# Patient Record
Sex: MALE | Race: WHITE | NOT HISPANIC OR LATINO | Employment: FULL TIME | URBAN - NONMETROPOLITAN AREA
[De-identification: names, ages, dates, MRNs, and addresses within clinical notes are randomized per-mention and may not be internally consistent; named-entity substitution may affect disease eponyms.]

---

## 2023-09-12 ENCOUNTER — OFFICE VISIT (OUTPATIENT)
Dept: PRIMARY CARE | Facility: CLINIC | Age: 43
End: 2023-09-12
Payer: COMMERCIAL

## 2023-09-12 VITALS
HEART RATE: 84 BPM | DIASTOLIC BLOOD PRESSURE: 74 MMHG | OXYGEN SATURATION: 98 % | BODY MASS INDEX: 38.22 KG/M2 | WEIGHT: 288.4 LBS | SYSTOLIC BLOOD PRESSURE: 130 MMHG | TEMPERATURE: 96.8 F | HEIGHT: 73 IN

## 2023-09-12 DIAGNOSIS — J06.9 UPPER RESPIRATORY TRACT INFECTION, UNSPECIFIED TYPE: ICD-10-CM

## 2023-09-12 DIAGNOSIS — H65.01 NON-RECURRENT ACUTE SEROUS OTITIS MEDIA OF RIGHT EAR: Primary | ICD-10-CM

## 2023-09-12 PROBLEM — I10 HTN (HYPERTENSION): Status: ACTIVE | Noted: 2023-09-12

## 2023-09-12 PROBLEM — R05.9 COUGH: Status: ACTIVE | Noted: 2023-09-12

## 2023-09-12 PROBLEM — R93.89 ABNORMAL CHEST XRAY: Status: ACTIVE | Noted: 2023-09-12

## 2023-09-12 PROBLEM — E66.9 OBESITY (BMI 30-39.9): Status: ACTIVE | Noted: 2023-09-12

## 2023-09-12 PROBLEM — L08.9 SKIN INFECTION: Status: ACTIVE | Noted: 2023-09-12

## 2023-09-12 PROBLEM — J45.909 ASTHMA (HHS-HCC): Status: ACTIVE | Noted: 2023-09-12

## 2023-09-12 PROBLEM — J32.0 CHRONIC MAXILLARY SINUSITIS: Status: ACTIVE | Noted: 2023-09-12

## 2023-09-12 PROBLEM — K21.9 GERD (GASTROESOPHAGEAL REFLUX DISEASE): Status: ACTIVE | Noted: 2023-09-12

## 2023-09-12 PROBLEM — R06.09 DYSPNEA ON EXERTION: Status: ACTIVE | Noted: 2023-09-12

## 2023-09-12 PROBLEM — R50.9 FEVER: Status: ACTIVE | Noted: 2023-09-12

## 2023-09-12 PROBLEM — M10.9 GOUT: Status: ACTIVE | Noted: 2023-09-12

## 2023-09-12 PROBLEM — J30.2 SEASONAL ALLERGIES: Status: ACTIVE | Noted: 2023-09-12

## 2023-09-12 PROCEDURE — 3078F DIAST BP <80 MM HG: CPT | Performed by: NURSE PRACTITIONER

## 2023-09-12 PROCEDURE — 99213 OFFICE O/P EST LOW 20 MIN: CPT | Performed by: NURSE PRACTITIONER

## 2023-09-12 PROCEDURE — 3075F SYST BP GE 130 - 139MM HG: CPT | Performed by: NURSE PRACTITIONER

## 2023-09-12 PROCEDURE — 87635 SARS-COV-2 COVID-19 AMP PRB: CPT

## 2023-09-12 RX ORDER — AMOXICILLIN 500 MG/1
1000 CAPSULE ORAL EVERY 12 HOURS SCHEDULED
Qty: 28 CAPSULE | Refills: 0 | Status: SHIPPED | OUTPATIENT
Start: 2023-09-12 | End: 2023-09-19

## 2023-09-12 RX ORDER — FLUTICASONE PROPIONATE 50 MCG
SPRAY, SUSPENSION (ML) NASAL
COMMUNITY

## 2023-09-12 RX ORDER — LEVOCETIRIZINE DIHYDROCHLORIDE 5 MG/1
TABLET, FILM COATED ORAL EVERY EVENING
COMMUNITY

## 2023-09-12 RX ORDER — ALBUTEROL SULFATE 90 UG/1
AEROSOL, METERED RESPIRATORY (INHALATION)
COMMUNITY
Start: 2021-09-23 | End: 2023-10-03 | Stop reason: SDUPTHER

## 2023-09-12 RX ORDER — VALSARTAN AND HYDROCHLOROTHIAZIDE 160; 25 MG/1; MG/1
TABLET ORAL
COMMUNITY
End: 2024-04-16 | Stop reason: SDUPTHER

## 2023-09-12 ASSESSMENT — ENCOUNTER SYMPTOMS
MYALGIAS: 0
DIAPHORESIS: 1
DIZZINESS: 0
VOMITING: 0
FEVER: 0
DIARRHEA: 0
COUGH: 1
NAUSEA: 0
SHORTNESS OF BREATH: 0
HEADACHES: 1
LIGHT-HEADEDNESS: 0
ARTHRALGIAS: 0
CHILLS: 1
CHEST TIGHTNESS: 0

## 2023-09-12 NOTE — LETTER
September 12, 2023     Patient: Connor Thornton   YOB: 1980   Date of Visit: 9/12/2023       To Whom It May Concern:    Connor Thornton was seen in my clinic on 9/12/2023 at 9:00 am. Please excuse Connor for his absence from work on 09/11/2023 through 09/13/2023 09 due to illness.   May return to work on 09/14/2023 without restriction     If you have any questions or concerns, please don't hesitate to call.         Sincerely,         Erik Bush, APRN-CNP        CC: No Recipients

## 2023-09-12 NOTE — PROGRESS NOTES
"Subjective   Patient ID: Connor Thornton is a 43 y.o. male who presents for Cough (Started sunday) and Sore Throat.    Sore Throat  Patient complains of sore throat. Associated symptoms include chills, dry cough, nasal blockage, post nasal drip, sinus and nasal congestion, and sore throat. Onset of symptoms was Sunday afternoon,  ago, and have been gradually worsening since that time. He is drinking plenty of fluids. He has had recent close exposure to someone with proven streptococcal pharyngitis.   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  Cough productive at times, thinks it may be sinus  Is using Mucinex DM.    Had COVID twice  Has not tested for COVID  Vaccine status: none          Review of Systems   Constitutional:  Positive for chills and diaphoresis. Negative for fever.   HENT:  Positive for congestion.    Respiratory:  Positive for cough. Negative for chest tightness and shortness of breath.    Cardiovascular:  Negative for chest pain.   Gastrointestinal:  Negative for diarrhea, nausea and vomiting.   Musculoskeletal:  Negative for arthralgias and myalgias.   Neurological:  Positive for headaches. Negative for dizziness and light-headedness.       Objective   /74 (Patient Position: Sitting)   Pulse 84   Temp 36 °C (96.8 °F)   Ht 1.854 m (6' 1\")   Wt 131 kg (288 lb 6.4 oz)   SpO2 98%   BMI 38.05 kg/m²     Physical Exam  Vitals reviewed.   Constitutional:       Appearance: Normal appearance. He is obese.   HENT:      Right Ear: Hearing normal. Drainage and swelling present.      Left Ear: Hearing normal.      Ears:      Comments: Bilateral external canal erythema     Nose:      Right Sinus: Frontal sinus tenderness present.      Left Sinus: No frontal sinus tenderness.      Mouth/Throat:      Lips: Pink.      Pharynx: Pharyngeal swelling and posterior oropharyngeal erythema present. No oropharyngeal exudate. "      Tonsils: No tonsillar exudate.   Cardiovascular:      Rate and Rhythm: Normal rate and regular rhythm.   Pulmonary:      Effort: Pulmonary effort is normal.      Breath sounds: Normal breath sounds.   Skin:     General: Skin is warm and dry.   Neurological:      Mental Status: He is alert.         Assessment/Plan   Diagnoses and all orders for this visit:  Non-recurrent acute serous otitis media of right ear  -     amoxicillin (Amoxil) 500 mg capsule; Take 2 capsules (1,000 mg) by mouth every 12 hours for 7 days.  Upper respiratory tract infection, unspecified type  -     amoxicillin (Amoxil) 500 mg capsule; Take 2 capsules (1,000 mg) by mouth every 12 hours for 7 days.  -     Sars-CoV-2 PCR, Symptomatic; Future  Instructed to isolate at home, until COVID test result available.  May take OTC Mucinex DM for cough/congestion

## 2023-09-13 ENCOUNTER — TELEPHONE (OUTPATIENT)
Dept: PRIMARY CARE | Facility: CLINIC | Age: 43
End: 2023-09-13
Payer: COMMERCIAL

## 2023-09-13 LAB — SARS-COV-2 RESULT: NOT DETECTED

## 2023-09-22 ENCOUNTER — OFFICE VISIT (OUTPATIENT)
Dept: PRIMARY CARE | Facility: CLINIC | Age: 43
End: 2023-09-22
Payer: COMMERCIAL

## 2023-09-22 VITALS
DIASTOLIC BLOOD PRESSURE: 82 MMHG | OXYGEN SATURATION: 99 % | WEIGHT: 305.8 LBS | BODY MASS INDEX: 40.35 KG/M2 | SYSTOLIC BLOOD PRESSURE: 120 MMHG | HEART RATE: 63 BPM

## 2023-09-22 DIAGNOSIS — H66.90 ACUTE OTITIS MEDIA, UNSPECIFIED OTITIS MEDIA TYPE: Primary | ICD-10-CM

## 2023-09-22 PROCEDURE — 3074F SYST BP LT 130 MM HG: CPT | Performed by: STUDENT IN AN ORGANIZED HEALTH CARE EDUCATION/TRAINING PROGRAM

## 2023-09-22 PROCEDURE — 1036F TOBACCO NON-USER: CPT | Performed by: STUDENT IN AN ORGANIZED HEALTH CARE EDUCATION/TRAINING PROGRAM

## 2023-09-22 PROCEDURE — 99213 OFFICE O/P EST LOW 20 MIN: CPT | Performed by: STUDENT IN AN ORGANIZED HEALTH CARE EDUCATION/TRAINING PROGRAM

## 2023-09-22 PROCEDURE — 3079F DIAST BP 80-89 MM HG: CPT | Performed by: STUDENT IN AN ORGANIZED HEALTH CARE EDUCATION/TRAINING PROGRAM

## 2023-09-22 RX ORDER — CEFDINIR 300 MG/1
300 CAPSULE ORAL 2 TIMES DAILY
Qty: 14 CAPSULE | Refills: 0 | Status: SHIPPED | OUTPATIENT
Start: 2023-09-22 | End: 2023-10-03 | Stop reason: ALTCHOICE

## 2023-09-22 NOTE — PROGRESS NOTES
Subjective   Patient ID: Connor Thornton is a 43 y.o. male who presents for Earache (Right ear feels like it's going to left ear).    HPI    Initially had left ear infection which was treated about a week ago. Now symptoms in the right ear. Worsening. Mild pain.     Review of Systems  ROS negative except discussed above in HPI.    Vitals:    09/22/23 1601   BP: 120/82   Pulse: 63   SpO2: 99%     Objective   Physical Exam  HENT:      Ears:      Comments: Inflammation of the right TM. Normal left TM.      Nose:      Comments: Mild turbinate inflammation.       Assessment/Plan   Connor was seen today for earache.  Diagnoses and all orders for this visit:  Acute otitis media, unspecified otitis media type (Primary)  -     cefdinir (Omnicef) 300 mg capsule; Take 1 capsule (300 mg) by mouth 2 times a day for 7 days.      Follow up as needed         Gerardo Ordonez MD MPH

## 2023-10-03 ENCOUNTER — OFFICE VISIT (OUTPATIENT)
Dept: PRIMARY CARE | Facility: CLINIC | Age: 43
End: 2023-10-03
Payer: COMMERCIAL

## 2023-10-03 ENCOUNTER — LAB (OUTPATIENT)
Dept: LAB | Facility: LAB | Age: 43
End: 2023-10-03
Payer: COMMERCIAL

## 2023-10-03 VITALS
WEIGHT: 303.3 LBS | BODY MASS INDEX: 40.2 KG/M2 | DIASTOLIC BLOOD PRESSURE: 92 MMHG | HEIGHT: 73 IN | OXYGEN SATURATION: 96 % | HEART RATE: 80 BPM | SYSTOLIC BLOOD PRESSURE: 120 MMHG

## 2023-10-03 DIAGNOSIS — M10.071 ACUTE IDIOPATHIC GOUT INVOLVING TOE OF RIGHT FOOT: Primary | ICD-10-CM

## 2023-10-03 DIAGNOSIS — J45.909 ASTHMA, UNSPECIFIED ASTHMA SEVERITY, UNSPECIFIED WHETHER COMPLICATED, UNSPECIFIED WHETHER PERSISTENT (HHS-HCC): ICD-10-CM

## 2023-10-03 DIAGNOSIS — M10.071 ACUTE IDIOPATHIC GOUT INVOLVING TOE OF RIGHT FOOT: ICD-10-CM

## 2023-10-03 LAB — URATE SERPL-MCNC: 8.5 MG/DL (ref 4–7.5)

## 2023-10-03 PROCEDURE — 3074F SYST BP LT 130 MM HG: CPT | Performed by: FAMILY MEDICINE

## 2023-10-03 PROCEDURE — 1036F TOBACCO NON-USER: CPT | Performed by: FAMILY MEDICINE

## 2023-10-03 PROCEDURE — 36415 COLL VENOUS BLD VENIPUNCTURE: CPT

## 2023-10-03 PROCEDURE — 3080F DIAST BP >= 90 MM HG: CPT | Performed by: FAMILY MEDICINE

## 2023-10-03 PROCEDURE — 99214 OFFICE O/P EST MOD 30 MIN: CPT | Performed by: FAMILY MEDICINE

## 2023-10-03 RX ORDER — ALBUTEROL SULFATE 90 UG/1
2 AEROSOL, METERED RESPIRATORY (INHALATION) EVERY 6 HOURS PRN
Qty: 18 G | Refills: 11 | Status: SHIPPED | OUTPATIENT
Start: 2023-10-03 | End: 2023-10-17 | Stop reason: SDUPTHER

## 2023-10-03 RX ORDER — PREDNISONE 20 MG/1
TABLET ORAL
Qty: 21 TABLET | Refills: 0 | Status: SHIPPED | OUTPATIENT
Start: 2023-10-03 | End: 2023-10-17 | Stop reason: ALTCHOICE

## 2023-10-03 ASSESSMENT — ENCOUNTER SYMPTOMS
FEVER: 0
CHILLS: 1

## 2023-10-03 NOTE — PROGRESS NOTES
"Subjective   Patient ID: Connor Thornton is a 43 y.o. male who presents for ST,ear pain, and gout (Pt.c/o ST, left ear pain, and gout flare up R big toe.).    HPI   3 weeks ago left ear and st woke up and could not swallow with gagging and dry heaving   Tx amoxil   Dx with infection   Felt better   After 4 days felt bad again   Then ear pain form right to left with sT   Rx cefdinir  which really did not help   Allergies on nasal spray and xyzal keep in control  Yestereday huert to swallow   Coughing last night by dry and taking mucine x dm     Gout 15 years ago rarely has flare up   Today yesterday gets 2 per year       Review of Systems   Constitutional:  Positive for chills. Negative for fever.       Objective   BP (!) 120/92   Pulse 80   Ht 1.854 m (6' 1\")   Wt 138 kg (303 lb 4.8 oz)   SpO2 96%   BMI 40.02 kg/m²     Physical Exam  Constitutional:       Appearance: Normal appearance.   HENT:      Head: Normocephalic and atraumatic.      Right Ear: Tympanic membrane, ear canal and external ear normal. There is no impacted cerumen.      Left Ear: Tympanic membrane, ear canal and external ear normal. There is no impacted cerumen.      Nose: Nose normal.      Mouth/Throat:      Mouth: Mucous membranes are moist.      Pharynx: Posterior oropharyngeal erythema present. No oropharyngeal exudate.   Eyes:      Conjunctiva/sclera: Conjunctivae normal.      Pupils: Pupils are equal, round, and reactive to light.   Cardiovascular:      Heart sounds: Normal heart sounds.   Pulmonary:      Effort: Pulmonary effort is normal.      Breath sounds: Normal breath sounds.   Musculoskeletal:      Comments: Right great toe dip joint red and swollen   Lymphadenopathy:      Cervical: No cervical adenopathy.   Skin:     Coloration: Skin is not jaundiced.   Neurological:      General: No focal deficit present.      Mental Status: He is alert and oriented to person, place, and time.   Psychiatric:         Mood and Affect: Mood normal.   "       Behavior: Behavior normal.         Thought Content: Thought content normal.         Judgment: Judgment normal.         Assessment/Plan   Diagnoses and all orders for this visit:  Acute idiopathic gout involving toe of right foot  -     Uric acid; Future  -     predniSONE (Deltasone) 20 mg tablet; Take 3 tabs (60mg) daily for 5 days, then take 2 tabs (40mg) daily for 2 days, then take 1 tab (20mg) daily for 2 days.  Asthma, unspecified asthma severity, unspecified whether complicated, unspecified whether persistent  -     albuterol 90 mcg/actuation inhaler; Inhale 2 puffs every 6 hours if needed for wheezing.

## 2023-10-04 DIAGNOSIS — M10.071 ACUTE IDIOPATHIC GOUT INVOLVING TOE OF RIGHT FOOT: Primary | ICD-10-CM

## 2023-10-04 RX ORDER — ALLOPURINOL 300 MG/1
300 TABLET ORAL DAILY
Qty: 30 TABLET | Refills: 11 | Status: SHIPPED | OUTPATIENT
Start: 2023-10-04 | End: 2023-10-17 | Stop reason: SDUPTHER

## 2023-10-11 ENCOUNTER — APPOINTMENT (OUTPATIENT)
Dept: PRIMARY CARE | Facility: CLINIC | Age: 43
End: 2023-10-11
Payer: COMMERCIAL

## 2023-10-17 ENCOUNTER — OFFICE VISIT (OUTPATIENT)
Dept: PRIMARY CARE | Facility: CLINIC | Age: 43
End: 2023-10-17
Payer: COMMERCIAL

## 2023-10-17 VITALS
DIASTOLIC BLOOD PRESSURE: 88 MMHG | HEART RATE: 92 BPM | HEIGHT: 73 IN | WEIGHT: 301.2 LBS | OXYGEN SATURATION: 96 % | SYSTOLIC BLOOD PRESSURE: 122 MMHG | BODY MASS INDEX: 39.92 KG/M2

## 2023-10-17 DIAGNOSIS — J45.909 ASTHMA, UNSPECIFIED ASTHMA SEVERITY, UNSPECIFIED WHETHER COMPLICATED, UNSPECIFIED WHETHER PERSISTENT (HHS-HCC): ICD-10-CM

## 2023-10-17 DIAGNOSIS — Q25.49: Primary | ICD-10-CM

## 2023-10-17 DIAGNOSIS — R73.03 PREDIABETES: ICD-10-CM

## 2023-10-17 DIAGNOSIS — M10.071 ACUTE IDIOPATHIC GOUT INVOLVING TOE OF RIGHT FOOT: ICD-10-CM

## 2023-10-17 PROCEDURE — 3079F DIAST BP 80-89 MM HG: CPT | Performed by: FAMILY MEDICINE

## 2023-10-17 PROCEDURE — 3074F SYST BP LT 130 MM HG: CPT | Performed by: FAMILY MEDICINE

## 2023-10-17 PROCEDURE — 1036F TOBACCO NON-USER: CPT | Performed by: FAMILY MEDICINE

## 2023-10-17 PROCEDURE — 99214 OFFICE O/P EST MOD 30 MIN: CPT | Performed by: FAMILY MEDICINE

## 2023-10-17 RX ORDER — BENZONATATE 200 MG/1
200 CAPSULE ORAL 3 TIMES DAILY PRN
Qty: 42 CAPSULE | Refills: 0 | Status: SHIPPED | OUTPATIENT
Start: 2023-10-17 | End: 2023-11-16

## 2023-10-17 RX ORDER — ESOMEPRAZOLE MAGNESIUM 40 MG/1
40 CAPSULE, DELAYED RELEASE ORAL DAILY PRN
COMMUNITY

## 2023-10-17 RX ORDER — ALLOPURINOL 300 MG/1
300 TABLET ORAL DAILY
Qty: 90 TABLET | Refills: 3 | Status: SHIPPED | OUTPATIENT
Start: 2023-10-17 | End: 2024-10-16

## 2023-10-17 RX ORDER — MONTELUKAST SODIUM 10 MG/1
10 TABLET ORAL NIGHTLY
Qty: 30 TABLET | Refills: 11 | Status: SHIPPED | OUTPATIENT
Start: 2023-10-17 | End: 2024-10-16

## 2023-10-17 RX ORDER — ALBUTEROL SULFATE 90 UG/1
2 AEROSOL, METERED RESPIRATORY (INHALATION) EVERY 6 HOURS PRN
Qty: 18 G | Refills: 11 | Status: SHIPPED | OUTPATIENT
Start: 2023-10-17 | End: 2024-10-16

## 2023-10-17 NOTE — PROGRESS NOTES
"Subjective   Patient ID: Connor Thornton is a 43 y.o. male who presents for 1 year.    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   Was rescanned in [place] 2022 and rescan every 5 years     Gout with high uric acid level      Started allopurinol     Asthma      Cough has been going on since on prednisone      Dry but occ some productive and thin clear       Sleep with fan on       Early am had some green tinge last week  but now clearerl           Last used albuterol yesterday and can breath better but the cough           Cough was worse at work     Allergy symptoms      In the fall             Review of Systems    Objective   /88   Pulse 92   Ht 1.854 m (6' 1\")   Wt 137 kg (301 lb 3.2 oz)   SpO2 96%   BMI 39.74 kg/m²     Physical Exam  Vitals reviewed.   Constitutional:       Appearance: Normal appearance.   HENT:      Head: Normocephalic and atraumatic.   Eyes:      Conjunctiva/sclera: Conjunctivae normal.   Cardiovascular:      Rate and Rhythm: Normal rate and regular rhythm.   Pulmonary:      Effort: Pulmonary effort is normal.      Breath sounds: Normal breath sounds.   Musculoskeletal:      Cervical back: Neck supple.   Skin:     General: Skin is warm and dry.   Neurological:      General: No focal deficit present.      Mental Status: He is alert and oriented to person, place, and time.   Psychiatric:         Mood and Affect: Mood normal.         Behavior: Behavior normal.         Thought Content: Thought content normal.         Judgment: Judgment normal.         Assessment/Plan   Diagnoses and all orders for this visit:  Kommerell's diverticulum  Asthma, unspecified asthma severity, unspecified whether complicated, unspecified whether persistent  -     albuterol 90 mcg/actuation inhaler; Inhale 2 puffs every 6 hours if needed for wheezing.  -     montelukast (Singulair) 10 mg tablet; Take 1 tablet (10 mg) by mouth once " daily at bedtime.  -     benzonatate (Tessalon) 200 mg capsule; Take 1 capsule (200 mg) by mouth 3 times a day as needed for cough. Do not crush or chew.  Acute idiopathic gout involving toe of right foot  -     allopurinol (Zyloprim) 300 mg tablet; Take 1 tablet (300 mg) by mouth once daily.  Prediabetes  -     Hemoglobin A1C; Future

## 2024-04-16 DIAGNOSIS — I10 HYPERTENSION, UNSPECIFIED TYPE: ICD-10-CM

## 2024-04-16 RX ORDER — VALSARTAN AND HYDROCHLOROTHIAZIDE 160; 25 MG/1; MG/1
1 TABLET ORAL DAILY
Qty: 90 TABLET | Refills: 1 | Status: SHIPPED | OUTPATIENT
Start: 2024-04-16

## 2024-04-16 NOTE — TELEPHONE ENCOUNTER
Medication Refill Request    Medication:  valsartan    Pharmacy:  [facility]    Last OV:  10/17/23  Upcoming appointment:  10/22/24    ORDER PENDED

## 2024-04-19 ENCOUNTER — OFFICE VISIT (OUTPATIENT)
Dept: PRIMARY CARE | Facility: CLINIC | Age: 44
End: 2024-04-19
Payer: COMMERCIAL

## 2024-04-19 VITALS
SYSTOLIC BLOOD PRESSURE: 140 MMHG | BODY MASS INDEX: 40.98 KG/M2 | TEMPERATURE: 97.3 F | WEIGHT: 310.6 LBS | HEART RATE: 72 BPM | OXYGEN SATURATION: 96 % | DIASTOLIC BLOOD PRESSURE: 90 MMHG

## 2024-04-19 DIAGNOSIS — S83.411A SPRAIN OF MEDIAL COLLATERAL LIGAMENT OF RIGHT KNEE, INITIAL ENCOUNTER: ICD-10-CM

## 2024-04-19 DIAGNOSIS — R10.31 RIGHT INGUINAL PAIN: ICD-10-CM

## 2024-04-19 DIAGNOSIS — J40 BRONCHITIS: Primary | ICD-10-CM

## 2024-04-19 PROCEDURE — 1036F TOBACCO NON-USER: CPT | Performed by: FAMILY MEDICINE

## 2024-04-19 PROCEDURE — 3080F DIAST BP >= 90 MM HG: CPT | Performed by: FAMILY MEDICINE

## 2024-04-19 PROCEDURE — 3077F SYST BP >= 140 MM HG: CPT | Performed by: FAMILY MEDICINE

## 2024-04-19 PROCEDURE — 99214 OFFICE O/P EST MOD 30 MIN: CPT | Performed by: FAMILY MEDICINE

## 2024-04-19 RX ORDER — DOXYCYCLINE 100 MG/1
100 CAPSULE ORAL 2 TIMES DAILY
Qty: 20 CAPSULE | Refills: 0 | Status: SHIPPED | OUTPATIENT
Start: 2024-04-19 | End: 2024-04-29

## 2024-04-19 RX ORDER — BENZONATATE 200 MG/1
200 CAPSULE ORAL 3 TIMES DAILY PRN
Qty: 42 CAPSULE | Refills: 0 | Status: SHIPPED | OUTPATIENT
Start: 2024-04-19 | End: 2024-05-19

## 2024-04-19 NOTE — PROGRESS NOTES
Subjective   Patient ID: Connor Thornton is a 43 y.o. male who presents for URI (Symptoms started on Tuesday; dry cough, sinus congestion, sinus drainage/When coughing has some discomfort RLQ/groin) and Knee Pain (Right; swells at night, achy pain/Strained knee while bowling).    URI     Knee Pain        Knee no injury bowled Saturday and had some toe pain 3 weeks ago   Next day knee hurt now comes and goes   Knee sleeve hurts  Behnid knee and  medial aching pain   No clicking no new catching states has had some grinding   Was swollen then next week   Otc aleve     Right groin with cough felt popping  No test pain   X 6 months   Daily    If gets then will call ing us     Cough 3 days mucus white clear   + sinus HA  right frontal parietal     No fever    No body aches    Review of Systems    Objective   /90 (BP Location: Left arm, Patient Position: Sitting)   Pulse 72   Temp 36.3 °C (97.3 °F)   Wt 141 kg (310 lb 9.6 oz)   SpO2 96%   BMI 40.98 kg/m²     Physical Exam  Constitutional:       Appearance: Normal appearance.   HENT:      Head: Normocephalic and atraumatic.      Right Ear: Tympanic membrane, ear canal and external ear normal.      Left Ear: Ear canal and external ear normal.      Nose: Nose normal.      Mouth/Throat:      Mouth: Mucous membranes are moist.      Pharynx: Oropharynx is clear.   Eyes:      Conjunctiva/sclera: Conjunctivae normal.      Pupils: Pupils are equal, round, and reactive to light.   Cardiovascular:      Rate and Rhythm: Normal rate and regular rhythm.      Heart sounds: Normal heart sounds.   Pulmonary:      Effort: Pulmonary effort is normal.      Breath sounds: Normal breath sounds.   Abdominal:      General: There is no distension.      Palpations: There is no mass.      Tenderness: There is no abdominal tenderness. There is no guarding or rebound.      Hernia: No hernia is present.   Musculoskeletal:      Comments: Right knee full range of motion no patellar apprehension  no joint effusion is tender with valgus stressing of the distal medial collateral on the right.  Normal DTRs no ligament stability.   Lymphadenopathy:      Cervical: No cervical adenopathy.   Skin:     Coloration: Skin is not jaundiced.   Neurological:      General: No focal deficit present.      Mental Status: He is alert and oriented to person, place, and time.   Psychiatric:         Mood and Affect: Mood normal.         Behavior: Behavior normal.         Thought Content: Thought content normal.         Judgment: Judgment normal.         Assessment/Plan   Diagnoses and all orders for this visit:  Bronchitis  -     doxycycline (Vibramycin) 100 mg capsule; Take 1 capsule (100 mg) by mouth 2 times a day for 10 days. Take with at least 8 ounces (large glass) of water, do not lie down for 30 minutes after  -     benzonatate (Tessalon) 200 mg capsule; Take 1 capsule (200 mg) by mouth 3 times a day as needed for cough. Do not crush or chew.  Sprain of medial collateral ligament of right knee, initial encounter  Right inguinal pain    Exercise given for medial collateral continue nonsteroidal and ice.

## 2024-09-25 DIAGNOSIS — I10 HYPERTENSION, UNSPECIFIED TYPE: ICD-10-CM

## 2024-09-25 RX ORDER — VALSARTAN AND HYDROCHLOROTHIAZIDE 160; 25 MG/1; MG/1
1 TABLET ORAL DAILY
Qty: 90 TABLET | Refills: 3 | OUTPATIENT
Start: 2024-09-25

## 2024-10-04 DIAGNOSIS — I10 HYPERTENSION, UNSPECIFIED TYPE: ICD-10-CM

## 2024-10-04 NOTE — TELEPHONE ENCOUNTER
Medication Refill Request    Medication:  valsartan    Pharmacy:  [facility]    Last OV:  4/19/24  Upcoming appointment:  10/22/24    ORDER PENDED

## 2024-10-07 RX ORDER — VALSARTAN AND HYDROCHLOROTHIAZIDE 160; 25 MG/1; MG/1
1 TABLET ORAL DAILY
Qty: 90 TABLET | Refills: 1 | Status: SHIPPED | OUTPATIENT
Start: 2024-10-07

## 2024-10-18 ENCOUNTER — LAB (OUTPATIENT)
Dept: LAB | Facility: LAB | Age: 44
End: 2024-10-18
Payer: COMMERCIAL

## 2024-10-18 ENCOUNTER — TELEPHONE (OUTPATIENT)
Dept: PRIMARY CARE | Facility: CLINIC | Age: 44
End: 2024-10-18

## 2024-10-18 DIAGNOSIS — R73.03 PREDIABETES: Primary | ICD-10-CM

## 2024-10-18 DIAGNOSIS — R73.03 PREDIABETES: ICD-10-CM

## 2024-10-18 LAB
ANION GAP SERPL CALC-SCNC: 13 MMOL/L (ref 10–20)
BUN SERPL-MCNC: 12 MG/DL (ref 6–23)
CALCIUM SERPL-MCNC: 9.4 MG/DL (ref 8.6–10.3)
CHLORIDE SERPL-SCNC: 100 MMOL/L (ref 98–107)
CO2 SERPL-SCNC: 30 MMOL/L (ref 21–32)
CREAT SERPL-MCNC: 1.03 MG/DL (ref 0.5–1.3)
EGFRCR SERPLBLD CKD-EPI 2021: >90 ML/MIN/1.73M*2
EST. AVERAGE GLUCOSE BLD GHB EST-MCNC: 114 MG/DL
GLUCOSE SERPL-MCNC: 88 MG/DL (ref 74–99)
HBA1C MFR BLD: 5.6 %
POTASSIUM SERPL-SCNC: 3.9 MMOL/L (ref 3.5–5.3)
SODIUM SERPL-SCNC: 139 MMOL/L (ref 136–145)

## 2024-10-18 PROCEDURE — 36415 COLL VENOUS BLD VENIPUNCTURE: CPT

## 2024-10-18 PROCEDURE — 83036 HEMOGLOBIN GLYCOSYLATED A1C: CPT

## 2024-10-18 PROCEDURE — 80048 BASIC METABOLIC PNL TOTAL CA: CPT

## 2024-10-18 NOTE — TELEPHONE ENCOUNTER
DOES HE HAVE BLOOD WORK FOR HIS A 1 C IF NOT ORDERED, CAN HE HAVE IT ORDERED. WOULD LIKE A PHONE CALL. THANK YOU.

## 2024-10-22 ENCOUNTER — APPOINTMENT (OUTPATIENT)
Dept: PRIMARY CARE | Facility: CLINIC | Age: 44
End: 2024-10-22
Payer: COMMERCIAL

## 2024-10-22 VITALS
BODY MASS INDEX: 40.65 KG/M2 | HEART RATE: 77 BPM | OXYGEN SATURATION: 96 % | WEIGHT: 308.1 LBS | DIASTOLIC BLOOD PRESSURE: 90 MMHG | SYSTOLIC BLOOD PRESSURE: 130 MMHG

## 2024-10-22 DIAGNOSIS — R10.31 RIGHT INGUINAL PAIN: ICD-10-CM

## 2024-10-22 DIAGNOSIS — I10 PRIMARY HYPERTENSION: ICD-10-CM

## 2024-10-22 DIAGNOSIS — J45.909 ASTHMA, UNSPECIFIED ASTHMA SEVERITY, UNSPECIFIED WHETHER COMPLICATED, UNSPECIFIED WHETHER PERSISTENT (HHS-HCC): ICD-10-CM

## 2024-10-22 DIAGNOSIS — M10.071 ACUTE IDIOPATHIC GOUT INVOLVING TOE OF RIGHT FOOT: Primary | ICD-10-CM

## 2024-10-22 DIAGNOSIS — R73.03 PREDIABETES: ICD-10-CM

## 2024-10-22 PROCEDURE — 3075F SYST BP GE 130 - 139MM HG: CPT | Performed by: FAMILY MEDICINE

## 2024-10-22 PROCEDURE — 1036F TOBACCO NON-USER: CPT | Performed by: FAMILY MEDICINE

## 2024-10-22 PROCEDURE — 99214 OFFICE O/P EST MOD 30 MIN: CPT | Performed by: FAMILY MEDICINE

## 2024-10-22 PROCEDURE — 3080F DIAST BP >= 90 MM HG: CPT | Performed by: FAMILY MEDICINE

## 2024-10-22 RX ORDER — MONTELUKAST SODIUM 10 MG/1
10 TABLET ORAL NIGHTLY
Qty: 30 TABLET | Refills: 11 | Status: CANCELLED | OUTPATIENT
Start: 2024-10-22 | End: 2025-10-22

## 2024-10-22 RX ORDER — ALBUTEROL SULFATE 90 UG/1
2 INHALANT RESPIRATORY (INHALATION) EVERY 6 HOURS PRN
Qty: 18 G | Refills: 11 | Status: CANCELLED | OUTPATIENT
Start: 2024-10-22 | End: 2025-10-22

## 2024-10-22 RX ORDER — ALLOPURINOL 300 MG/1
300 TABLET ORAL DAILY
Qty: 90 TABLET | Refills: 3 | Status: SHIPPED | OUTPATIENT
Start: 2024-10-22 | End: 2025-10-22

## 2024-10-22 ASSESSMENT — ENCOUNTER SYMPTOMS: HYPERTENSION: 1

## 2024-10-22 NOTE — PROGRESS NOTES
Subjective   Patient ID: Connor Thornton is a 44 y.o. male who presents for Annual Exam, Asthma, Gout, and Hypertension.    Asthma  His past medical history is significant for asthma.   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   HENT:      Head: Normocephalic and atraumatic.      Right Ear: Tympanic membrane, ear canal and external ear normal.      Left Ear: Ear canal and external ear normal.      Nose: Nose normal.      Mouth/Throat:      Mouth: Mucous membranes are moist.      Pharynx: Oropharynx is clear.   Eyes:      Conjunctiva/sclera: Conjunctivae normal.      Pupils: Pupils are equal, round, and reactive to light.   Cardiovascular:      Rate and Rhythm: Normal rate and regular rhythm.      Heart sounds: Normal heart sounds.   Pulmonary:      Effort: Pulmonary effort is normal.      Breath sounds: Normal breath sounds.   Abdominal:      General: There is no distension.      Palpations: There is no mass.      Tenderness: There is no abdominal tenderness.  There is no guarding or rebound.      Hernia: No hernia is present.   Musculoskeletal:      Comments: Right knee full range of motion no patellar apprehension no joint effusion is tender with valgus stressing of the distal medial collateral on the right.  Normal DTRs no ligament stability.   Lymphadenopathy:      Cervical: No cervical adenopathy.   Skin:     Coloration: Skin is not jaundiced.   Neurological:      General: No focal deficit present.      Mental Status: He is alert and oriented to person, place, and time.   Psychiatric:         Mood and Affect: Mood normal.         Behavior: Behavior normal.         Thought Content: Thought content normal.         Judgment: Judgment normal.         Assessment/Plan   Diagnoses and all orders for this visit:  Acute idiopathic gout involving toe of right foot  -     allopurinol (Zyloprim) 300 mg tablet; Take 1 tablet (300 mg) by mouth once daily.  -     Uric acid; Future  -     Uric acid; Future  Asthma, unspecified asthma severity, unspecified whether complicated, unspecified whether persistent (HHS-HCC)  Right inguinal pain  -     US abdomen limited; Future  Prediabetes  -     Hemoglobin A1C; Future  Primary hypertension  -     Lipid Panel; Future  -     Basic Metabolic Panel; Future

## 2024-10-28 ENCOUNTER — HOSPITAL ENCOUNTER (OUTPATIENT)
Dept: RADIOLOGY | Facility: HOSPITAL | Age: 44
Discharge: HOME | End: 2024-10-28
Payer: COMMERCIAL

## 2024-10-28 DIAGNOSIS — R10.31 RIGHT INGUINAL PAIN: ICD-10-CM

## 2024-10-28 PROCEDURE — 76882 US LMTD JT/FCL EVL NVASC XTR: CPT | Performed by: STUDENT IN AN ORGANIZED HEALTH CARE EDUCATION/TRAINING PROGRAM

## 2024-10-28 PROCEDURE — 76881 US COMPL JOINT R-T W/IMG: CPT

## 2024-11-14 ENCOUNTER — TELEPHONE (OUTPATIENT)
Dept: PRIMARY CARE | Facility: CLINIC | Age: 44
End: 2024-11-14
Payer: COMMERCIAL

## 2024-11-14 NOTE — TELEPHONE ENCOUNTER
HE CALLED TO SEE IF YOU GOT THE RESULTS BACK FROM HIS US AND BLOOD WORK. WOULD LIKE A CALL BACK. THANK YOU.   
yes

## 2024-11-22 ENCOUNTER — TELEPHONE (OUTPATIENT)
Dept: PRIMARY CARE | Facility: CLINIC | Age: 44
End: 2024-11-22
Payer: COMMERCIAL

## 2024-11-22 NOTE — TELEPHONE ENCOUNTER
Patients results were sent via [employer] patient states he does not use his Proximagent for this because he has terrible service in his home and it will not load. Patient would like a call for all test results. A note was added to his chart for this reason. Patient did state understanding to results and had no questions.     MORTEZA TONG MA

## 2024-12-27 ENCOUNTER — OFFICE VISIT (OUTPATIENT)
Dept: URGENT CARE | Facility: CLINIC | Age: 44
End: 2024-12-27
Payer: COMMERCIAL

## 2024-12-27 VITALS
BODY MASS INDEX: 41.72 KG/M2 | RESPIRATION RATE: 16 BRPM | SYSTOLIC BLOOD PRESSURE: 132 MMHG | DIASTOLIC BLOOD PRESSURE: 92 MMHG | WEIGHT: 308 LBS | TEMPERATURE: 97.2 F | HEART RATE: 65 BPM | OXYGEN SATURATION: 97 % | HEIGHT: 72 IN

## 2024-12-27 DIAGNOSIS — B37.2 CANDIDIASIS OF SKIN: Primary | ICD-10-CM

## 2024-12-27 PROCEDURE — 99213 OFFICE O/P EST LOW 20 MIN: CPT | Performed by: NURSE PRACTITIONER

## 2024-12-27 RX ORDER — FLUCONAZOLE 150 MG/1
150 TABLET ORAL
Qty: 2 TABLET | Refills: 0 | Status: SHIPPED | OUTPATIENT
Start: 2024-12-27

## 2024-12-27 RX ORDER — CLOTRIMAZOLE AND BETAMETHASONE DIPROPIONATE 10; .64 MG/G; MG/G
1 CREAM TOPICAL 2 TIMES DAILY
Qty: 45 G | Refills: 2 | Status: SHIPPED | OUTPATIENT
Start: 2024-12-27 | End: 2025-01-24

## 2024-12-27 NOTE — PROGRESS NOTES
[facility]   YANNI NOTE:      Name: Connor Thornton, 44 y.o.    CSN:[id number]   MRN:[id number]    PCP: Dhiraj Scanlon MD    ALL:    Allergies   Allergen Reactions    Bee Pollen Unknown    House Dust Unknown    Mold Unknown    Amoxicillin-Pot Clavulanate Rash, Unknown and Hives       History:    Chief Complaint: Rash (Left axillary skin irritation X 1 month )    Encounter Date: 12/27/2024      HPI: The history was obtained from the patient. Connor is a 44 y.o. male, who presents with a chief complaint of Rash (Left axillary skin irritation X 1 month )     Rash appeared to the left axilla and bellybutton for  30 days ago.  Reports the rash is scaly, moist, and reddened; it is itchy. Denies having rash anywhere else.  Denies any swelling or drainage to the area. No known injury to the area or known irritants. Denies any fever, chills, malaise, dyspnea, nausea, vomiting, cold symptoms, or other constitutional signs and symptoms. Has not tried any OTC medications for the rash.      PMHx:    Past Medical History:   Diagnosis Date    Ear infection               Current Outpatient Medications   Medication Sig Dispense Refill    allopurinol (Zyloprim) 300 mg tablet Take 1 tablet (300 mg) by mouth once daily. 90 tablet 3    esomeprazole (NexIUM) 40 mg DR capsule Take 1 capsule (40 mg) by mouth once daily as needed.      fluticasone (Flonase Allergy Relief) 50 mcg/actuation nasal spray Administer into affected nostril(s).      levocetirizine (Xyzal) 5 mg tablet Take by mouth once daily in the evening.      valsartan-hydrochlorothiazide (Diovan-HCT) 160-25 mg tablet Take 1 tablet by mouth once daily. 90 tablet 1    albuterol 90 mcg/actuation inhaler Inhale 2 puffs every 6 hours if needed for wheezing. 18 g 11    clotrimazole-betamethasone (Lotrisone) cream Apply 1 Application topically 2 times a day for 28 days. 45 g 2    fluconazole (Diflucan) 150 mg tablet Take 1 tablet (150 mg) by mouth every 3rd day. 2  tablet 0    montelukast (Singulair) 10 mg tablet Take 1 tablet (10 mg) by mouth once daily at bedtime. 30 tablet 11     No current facility-administered medications for this visit.         PMSx:    Past Surgical History:   Procedure Laterality Date    OTHER SURGICAL HISTORY  11/03/2020    Knee surgery       Fam Hx: No family history on file.    SOC. Hx:     Social History     Socioeconomic History    Marital status: Single     Spouse name: Not on file    Number of children: Not on file    Years of education: Not on file    Highest education level: Not on file   Occupational History    Not on file   Tobacco Use    Smoking status: Never    Smokeless tobacco: Never   Vaping Use    Vaping status: Never Used   Substance and Sexual Activity    Alcohol use: Yes    Drug use: Never    Sexual activity: Not on file   Other Topics Concern    Not on file   Social History Narrative    Not on file     Social Drivers of Health     Financial Resource Strain: Not on file   Food Insecurity: Not on file   Transportation Needs: Not on file   Physical Activity: Not on file   Stress: Not on file   Social Connections: Not on file   Intimate Partner Violence: Not on file   Housing Stability: Not on file         Vitals:    12/27/24 1450   BP: (!) 132/92   Pulse: 65   Resp: 16   Temp: 36.2 °C (97.2 °F)   SpO2: 97%     140 kg (308 lb)          Physical Exam  Vitals and nursing note reviewed.   Constitutional:       General: He is not in acute distress.     Appearance: Normal appearance. He is not ill-appearing.   HENT:      Head: Normocephalic and atraumatic.      Mouth/Throat:      Mouth: Mucous membranes are moist.   Cardiovascular:      Rate and Rhythm: Normal rate and regular rhythm.      Heart sounds: Normal heart sounds.   Pulmonary:      Effort: Pulmonary effort is normal.      Breath sounds: Normal breath sounds.   Abdominal:      General: Bowel sounds are normal.   Skin:     General: Skin is warm and dry.      Capillary Refill:  Capillary refill takes less than 2 seconds.      Findings: Rash present.      Comments: Fungal like rash to the axilla and bellybutton   Neurological:      Mental Status: He is alert and oriented to person, place, and time.       ____________________________________________________________________    I did personally review Connor's past medical history, surgical history, social history, as well as family history (when relevant).  In this case, I also oversaw the his drug management by reviewing his medication list, allergy list, as well as the medications that I prescribed during the UC course and/or recommended as an out-patient (including possible OTC medications such as acetaminophen, NSAIDs , etc).    After reviewing the items above, I did look at previous medical documentation, such as recent hospitalizations, office visits, and/or recent consultations with PCP/specialist.                          SDOH:   Another factor that I considered in Connor's care was his Social Determinants of Health (SDOH). During this UC encounter, he did not have social determinants of health. Those SDOH influencing Connor's care are: none      _____________________________________________________________________      UC COURSE/MEDICAL DECISION MAKING:    Connor is a 44 y.o., who presents with a working diagnosis of   1. Candidiasis of skin        Connor was seen today for rash.  Diagnoses and all orders for this visit:  Candidiasis of skin (Primary)  -     clotrimazole-betamethasone (Lotrisone) cream; Apply 1 Application topically 2 times a day for 28 days.  -     fluconazole (Diflucan) 150 mg tablet; Take 1 tablet (150 mg) by mouth every 3rd day.         Plan of care reviewed with patient.  If symptoms change and/or worsen will follow-up with primary care provider, return to urgent care, or go to the nearest emergency department for further evaluation.  Patient states understanding and is agreeable with plan of care.      Denae  GET Nagel, CHAD   Advanced Practice Provider  [facility]    Please note: While the patient may or may not have received printed discharge paperwork, all relevant medical findings, test results, and treatment details are accessible through the electronic medical record system. The patient is encouraged to review their chart via the patient portal for comprehensive information and follow-up instructions.

## 2025-01-10 ENCOUNTER — OFFICE VISIT (OUTPATIENT)
Dept: URGENT CARE | Facility: CLINIC | Age: 45
End: 2025-01-10
Payer: COMMERCIAL

## 2025-01-10 VITALS
OXYGEN SATURATION: 98 % | HEIGHT: 72 IN | SYSTOLIC BLOOD PRESSURE: 132 MMHG | WEIGHT: 309 LBS | BODY MASS INDEX: 41.85 KG/M2 | HEART RATE: 61 BPM | TEMPERATURE: 97 F | RESPIRATION RATE: 16 BRPM | DIASTOLIC BLOOD PRESSURE: 92 MMHG

## 2025-01-10 DIAGNOSIS — J20.8 ACUTE BRONCHITIS, VIRAL: Primary | ICD-10-CM

## 2025-01-10 PROCEDURE — 99213 OFFICE O/P EST LOW 20 MIN: CPT | Performed by: NURSE PRACTITIONER

## 2025-01-10 RX ORDER — BENZONATATE 100 MG/1
100-200 CAPSULE ORAL EVERY 8 HOURS PRN
Qty: 40 CAPSULE | Refills: 0 | Status: SHIPPED | OUTPATIENT
Start: 2025-01-10

## 2025-01-10 NOTE — PROGRESS NOTES
[facility] URGENT CARE  Trixie NAZ Hunter, APRN-CNP     Visit Note - 1/10/2025 12:20 PM   This note was generated with voice recognition software and may contain errors including spelling, grammar, syntax, and misrecognization of what was dictated.    Patient: Connor Thornton, MRN: [id number], 44 y.o., male   PCP: Dhiraj Scanlon MD  ------------------------------------  ALLERGIES:   Allergies   Allergen Reactions    Bee Pollen Unknown    House Dust Unknown    Mold Unknown    Amoxicillin-Pot Clavulanate Rash, Unknown and Hives        CURRENT MEDICATIONS:   Current Outpatient Medications   Medication Instructions    albuterol 90 mcg/actuation inhaler 2 puffs, inhalation, Every 6 hours PRN    allopurinol (ZYLOPRIM) 300 mg, oral, Daily    benzonatate (TESSALON) 100-200 mg, oral, Every 8 hours PRN, Do not crush or chew.    clotrimazole-betamethasone (Lotrisone) cream 1 Application, Topical, 2 times daily    esomeprazole (NEXIUM) 40 mg, Daily PRN    fluconazole (DIFLUCAN) 150 mg, oral, Every 72 hours    fluticasone (Flonase Allergy Relief) 50 mcg/actuation nasal spray Administer into affected nostril(s).    levocetirizine (Xyzal) 5 mg tablet Every evening    montelukast (SINGULAIR) 10 mg, oral, Nightly    valsartan-hydrochlorothiazide (Diovan-HCT) 160-25 mg tablet 1 tablet, oral, Daily     ------------------------------------  PAST MEDICAL HX:  Patient Active Problem List   Diagnosis    Abnormal chest xray    Asthma    Chronic maxillary sinusitis    Cough    Dyspnea on exertion    Fever    GERD (gastroesophageal reflux disease)    Gout    HTN (hypertension)    Obesity (BMI 30-39.9)    Skin infection    Seasonal allergies    Kommerell's diverticulum ([facility]-HCC)      SURGICAL HX:  Past Surgical History:   Procedure Laterality Date    OTHER SURGICAL HISTORY  11/03/2020    Knee surgery      FAMILY HX:   No pertinent history.   SOCIAL HX:    reports that he has never smoked. He has never used smokeless tobacco. Employed.    ------------------------------------  CHIEF COMPLAINT:   Chief Complaint   Patient presents with    URI     Cough, some congestion, sore throat x 4 days      HISTORY OF PRESENT ILLNESS: The history was obtained from patientTod Ryan is a 44 y.o. male, who presents with a chief complaint of chest congestion, a productive cough (clear/white/yellow phlegm), slight sore throat, nasal congestion (clear/yellow/green/blood-tinged mucus), and intermittent wheezing - sxs started on Tuesday night. Denies any fever/chills, body aches, ear pain, abdominal pain, chest pain, shortness of breath, urinary symptoms, nausea/vomiting, and diarrhea. Was recently treated for a fungal rash and reports sxs are resolving. Denies any lightheadedness or dizziness; no changes in mental status. No swelling in legs. Appetite is normal; is able to eat and drink fluids without difficulty; denies loss of sense of taste or smell. Reports symptoms are unchanged since onset. Has been taking  Sudafed, Mucinex DM, and Tessalon Pearles  without much relief; no other over-the-counter medications or home remedies for symptom management.  His boss has been ill recently with similar sxs; no other known ill contacts. Has not received the COVID vaccine. Has not received this season's influenza vaccine.. Last known COVID infection was in 2024.  Is not a smoker.  Has history of asthma; does not feel symptoms are currently flared.     REVIEW OF SYSTEMS:  10 systems reviewed negative with exception of history of present illness as listed above.    TODAY'S VITALS: BP (!) 132/92   Pulse 61   Temp 36.1 °C (97 °F)   Resp 16   Ht 1.829 m (6')   Wt 140 kg (309 lb)   SpO2 98%   BMI 41.91 kg/m²     PHYSICAL EXAMINATION:  General:  Mildly ill-appearing, well nourished male; alert and oriented; in no acute distress. Sitting comfortably on exam chair. Non-dyspneic.   Eyes:  Pupils equal, round and reactive to light. No conjunctival erythema; no scleral icterus.    HENT: No frontal or maxillary sinus tenderness; + audible nasal congestion. Airway patent, TMs and ear canals clear/unremarkable bilaterally. Nasal mucosa mildly injected and edematous. Oral mucosa moist. Posterior pharynx mildly injected but without vesicles or oropharyngeal exudate. Uvula is midline. Managing oral secretions without difficulty.  Neck:  Supple. Mildly tender, mobile anterior cervical lymphadenopathy bilat. Trachea is midline.  Respiratory:  Respirations easy and unlabored, Breath sounds equal. Lungs are clear to auscultation; no wheezes, rhonchi, or rales; has good air movement throughout. + semi-productive cough noted. Non-dyspneic with ambulation; able to maintain SpO2.  Cardiovascular:  Normal rate, Regular rhythm. Normal S1S2. No m/r/g. No peripheral edema.   Gastrointestinal:  Soft, non-tender, non-distended; no palpable masses or organomegaly. Bowel sounds normoactive.  Musculoskeletal:  Grossly normal; appropriate for age.     Integumentary:  Pink, warm, dry, and intact. No rashes or skin discoloration appreciated. Good skin turgor.  Neurologic:  Alert and oriented, no gross deficits.    Cognition and Speech:  Oriented, Speech clear and coherent.    Psychiatric:  Cooperative, Appropriate mood & affect.       ------------------------------------  Medical Decision Making  LABORATORY or RADIOLOGICAL IMAGING ORDERS/RESULTS:   None    IMPRESSION/PLAN:  Course: Worsening; stable    1. Acute bronchitis, viral (Primary)  - benzonatate (Tessalon) 100 mg capsule; Take 1-2 capsules (100-200 mg) by mouth every 8 hours if needed for cough. Do not crush or chew.  Dispense: 40 capsule; Refill: 0    No red flags on exam today. Symptoms consistent with viral bronchitis with associated symptoms, but reviewed other potential etiologies. Patient declines testing for COVID/influenza/strep. No antibiotics indicated at this point, but will start cough medication (benzonatate) for PRN use, and encouraged to  continue Albuterol inhaler for PRN use, although no sign of asthma flare at this point. Instructed to push fluids, rest, and to use appropriate over the counter medications as needed for management of symptoms - plain Mucinex may be helpful. Reviewed instructions for self-isolation and continued monitoring. Reviewed red flags to monitor for, counseled on potential adverse reactions of treatments, expectations for improvement in sxs, and advised to follow-up with primary care provider in 3-5 days if symptoms persist, or to seek care sooner if worsening or if any additional concerns/red flags develop.  Patient agreed with plan of care; questions were encouraged and answered.       GET Ken-CNP   Advanced Practice Provider  [facility] URGENT CARE

## 2025-01-17 DIAGNOSIS — I10 HYPERTENSION, UNSPECIFIED TYPE: ICD-10-CM

## 2025-01-17 RX ORDER — VALSARTAN AND HYDROCHLOROTHIAZIDE 160; 25 MG/1; MG/1
1 TABLET ORAL DAILY
Qty: 90 TABLET | Refills: 1 | Status: SHIPPED | OUTPATIENT
Start: 2025-01-17

## 2025-01-17 RX ORDER — VALSARTAN AND HYDROCHLOROTHIAZIDE 160; 25 MG/1; MG/1
1 TABLET ORAL DAILY
Qty: 30 TABLET | Refills: 0 | Status: SHIPPED | OUTPATIENT
Start: 2025-01-17 | End: 2026-01-17

## 2025-01-17 NOTE — TELEPHONE ENCOUNTER
Medication Refill Request    Medication:  valsartan    Pharmacy:  [facility]    Last OV:  10/22/24  Upcoming appointment:  9/25/25    ORDER PENDED

## 2025-01-18 ENCOUNTER — OFFICE VISIT (OUTPATIENT)
Dept: URGENT CARE | Facility: CLINIC | Age: 45
End: 2025-01-18
Payer: COMMERCIAL

## 2025-01-18 VITALS
HEIGHT: 72 IN | WEIGHT: 309 LBS | TEMPERATURE: 97 F | DIASTOLIC BLOOD PRESSURE: 71 MMHG | SYSTOLIC BLOOD PRESSURE: 151 MMHG | OXYGEN SATURATION: 96 % | BODY MASS INDEX: 41.85 KG/M2 | HEART RATE: 72 BPM | RESPIRATION RATE: 16 BRPM

## 2025-01-18 DIAGNOSIS — J20.9 ACUTE BRONCHITIS, UNSPECIFIED ORGANISM: Primary | ICD-10-CM

## 2025-01-18 PROCEDURE — 99213 OFFICE O/P EST LOW 20 MIN: CPT | Performed by: NURSE PRACTITIONER

## 2025-01-18 RX ORDER — PROMETHAZINE HYDROCHLORIDE AND DEXTROMETHORPHAN HYDROBROMIDE 6.25; 15 MG/5ML; MG/5ML
5 SYRUP ORAL EVERY 6 HOURS PRN
Qty: 120 ML | Refills: 0 | Status: SHIPPED | OUTPATIENT
Start: 2025-01-18

## 2025-01-18 RX ORDER — PREDNISONE 10 MG/1
TABLET ORAL
Qty: 21 TABLET | Refills: 0 | Status: SHIPPED | OUTPATIENT
Start: 2025-01-18

## 2025-01-18 RX ORDER — DOXYCYCLINE 100 MG/1
100 TABLET ORAL 2 TIMES DAILY
Qty: 20 TABLET | Refills: 0 | Status: SHIPPED | OUTPATIENT
Start: 2025-01-18 | End: 2025-01-28

## 2025-01-18 NOTE — PROGRESS NOTES
[facility] URGENT CARE  Trixie Hunter, APRN-CNP     Visit Note - 1/18/2025 11:50 AM   This note was generated with voice recognition software and may contain errors including spelling, grammar, syntax, and misrecognization of what was dictated.    Patient: Connor Thornton, MRN: [id number], 44 y.o., male   PCP: Dhiraj Scanlon MD  ------------------------------------  ALLERGIES:   Allergies   Allergen Reactions    Bee Pollen Unknown    House Dust Unknown    Mold Unknown    Amoxicillin-Pot Clavulanate Rash, Unknown and Hives        CURRENT MEDICATIONS:   Current Outpatient Medications   Medication Instructions    albuterol 90 mcg/actuation inhaler 2 puffs, inhalation, Every 6 hours PRN    allopurinol (ZYLOPRIM) 300 mg, oral, Daily    benzonatate (TESSALON) 100-200 mg, oral, Every 8 hours PRN, Do not crush or chew.    clotrimazole-betamethasone (Lotrisone) cream 1 Application, Topical, 2 times daily    doxycycline (ADOXA) 100 mg, oral, 2 times daily, Take with a full glass of water and do not lie down for at least 30 minutes after.    esomeprazole (NEXIUM) 40 mg, Daily PRN    fluconazole (DIFLUCAN) 150 mg, oral, Every 72 hours    fluticasone (Flonase Allergy Relief) 50 mcg/actuation nasal spray Administer into affected nostril(s).    levocetirizine (Xyzal) 5 mg tablet Every evening    montelukast (SINGULAIR) 10 mg, oral, Nightly    predniSONE (Deltasone) 10 mg tablet Take 6 tabs PO daily x1 day, then take 5 tabs daily x1 day, then take 4 tabs daily x1 day, then take 3 tabs daily x1 day, then take 2 tabs daily x1 day, then take 1 tab daily x1 day. Take with a meal.    promethazine-DM (Phenergan-DM) 6.25-15 mg/5 mL syrup 5 mL, oral, Every 6 hours PRN, *caution - can cause drowsiness*    valsartan-hydrochlorothiazide (Diovan-HCT) 160-25 mg tablet 1 tablet, oral, Daily    valsartan-hydrochlorothiazide (Diovan-HCT) 160-25 mg tablet 1 tablet, oral, Daily     ------------------------------------  PAST MEDICAL  HX:  Patient Active Problem List   Diagnosis    Abnormal chest xray    Asthma    Chronic maxillary sinusitis    Cough    Dyspnea on exertion    Fever    GERD (gastroesophageal reflux disease)    Gout    HTN (hypertension)    Obesity (BMI 30-39.9)    Skin infection    Seasonal allergies    Kommerell's diverticulum ([facility]HS-HCC)      SURGICAL HX:  Past Surgical History:   Procedure Laterality Date    OTHER SURGICAL HISTORY  11/03/2020    Knee surgery      FAMILY HX:   No pertinent history.   SOCIAL HX:    reports that he has never smoked. He has never used smokeless tobacco. Employed.   ------------------------------------  CHIEF COMPLAINT:   Chief Complaint   Patient presents with    URI     Dry cough and chest congestion. Pt was seen on 1/10/25.       HISTORY OF PRESENT ILLNESS: The history was obtained from patient. Connor is a 44 y.o. male, who presents with a chief complaint of a sometimes dry/sometimes productive cough (clear phlegm) and chest congestion that is keeping him awake at night - sxs started ~12 days ago. Has also had fatigue and body aches that he attributes to his coughing fits. Had R ear discomfort for ~1 day, but it resolved; has slight nasal congestion but reports it does not seem much worse than his baseline congestion related to seasonal allergies. Had an episode of diarrhea recently but attributes it to a frosty he ate (is lactose intolerant). Denies any fever/chills, sore throat, abdominal pain, chest pain, wheezing/shortness of breath, rashes, urinary symptoms, and nausea/vomiting. Denies any lightheadedness or dizziness; no changes in mental status. No swelling in legs. Appetite is normal; is able to eat and drink fluids without difficulty; denies loss of sense of taste or smell. Reports symptoms have gotten worse since onset. Has been taking  Sudafed, Mucinex, Mucinex DM, and benzonatate  without much relief; no other over-the-counter medications or home remedies for symptom management.  His  "leeann has been ill recently with similar sxs; no other known ill contacts. Has not received the COVID vaccine. Has not received this season's influenza vaccine.. Last known COVID infection was in 2024.  Is not a smoker.  Has history of asthma.     REVIEW OF SYSTEMS:  10 systems reviewed negative with exception of history of present illness as listed above.    TODAY'S VITALS: /71   Pulse 72   Temp 36.1 °C (97 °F) (Temporal)   Resp 16   Ht 1.84 m (6' 0.44\")   Wt 140 kg (309 lb)   SpO2 96%   BMI 41.40 kg/m²     PHYSICAL EXAMINATION:  General:  Mildly ill-appearing, well nourished male; alert and oriented; in no acute distress. Sitting comfortably on exam chair. Non-dyspneic.   Eyes:  Pupils equal, round and reactive to light. No conjunctival erythema; no scleral icterus.   HENT: No frontal or maxillary sinus tenderness; + mild, audible nasal congestion. Airway patent, TMs and ear canals clear/unremarkable bilaterally. Nasal mucosa mildly injected and edematous. Oral mucosa moist. Posterior pharynx mildly injected but without lesions or oropharyngeal exudate. Uvula is midline. Managing oral secretions without difficulty.  Neck:  Supple. Mildly tender, mobile anterior cervical lymphadenopathy bilat. Trachea is midline.  Respiratory:  Respirations easy and unlabored, Breath sounds equal. Lungs are clear to auscultation; no wheezes, rhonchi, or rales; has good air movement throughout. + harsh, semi-productive cough noted. Non-dyspneic with ambulation; able to maintain SpO2.  Cardiovascular:  Normal rate, Regular rhythm. Normal S1S2. No m/r/g. No peripheral edema.   Gastrointestinal:  Soft, non-tender, non-distended; no palpable masses or organomegaly. Bowel sounds normoactive.  Musculoskeletal:  Grossly normal; appropriate for age.     Integumentary:  Pink, warm, dry, and intact. No rashes or skin discoloration appreciated. Good skin turgor.  Neurologic:  Alert and oriented, no gross deficits.    Cognition and " Speech:  Oriented, Speech clear and coherent.    Psychiatric:  Cooperative, Appropriate mood & affect.       ------------------------------------  Medical Decision Making  LABORATORY or RADIOLOGICAL IMAGING ORDERS/RESULTS:   None    IMPRESSION/PLAN:  Course: Worsening; stable    1. Acute bronchitis, unspecified organism (Primary)  - doxycycline (Adoxa) 100 mg tablet; Take 1 tablet (100 mg) by mouth 2 times a day for 10 days. Take with a full glass of water and do not lie down for at least 30 minutes after.  Dispense: 20 tablet; Refill: 0  - predniSONE (Deltasone) 10 mg tablet; Take 6 tabs PO daily x1 day, then take 5 tabs daily x1 day, then take 4 tabs daily x1 day, then take 3 tabs daily x1 day, then take 2 tabs daily x1 day, then take 1 tab daily x1 day. Take with a meal.  Dispense: 21 tablet; Refill: 0  - promethazine-DM (Phenergan-DM) 6.25-15 mg/5 mL syrup; Take 5 mL by mouth every 6 hours if needed for cough. *caution - can cause drowsiness*  Dispense: 120 mL; Refill: 0    No red flags on exam today. Symptoms consistent with acute bronchitis, but reviewed other potential etiologies. Due to severity and duration of symptoms, will begin treatment with Doxycycline and prednisone taper today; will also start cough medication (Promethazine DM) for PRN use; should also continue Albuterol inhaler for PRN use. Instructed to push fluids, rest, and to use appropriate over the counter medications as needed for management of symptoms - plain Mucinex may be helpful. Reviewed instructions for self-isolation and continued monitoring. Reviewed red flags to monitor for, counseled on potential adverse reactions of treatments, expectations for improvement in sxs, and advised to follow-up with primary care provider in 2-3 days if symptoms persist, or to seek care sooner if worsening or if any additional concerns/red flags develop - may need to consider CXR and further evaluation if symptoms persist/worsen.  Patient agreed with  plan of care; questions were encouraged and answered.       GET Ken-CNP   Advanced Practice Provider  [facility] URGENT CARE

## 2025-05-08 ENCOUNTER — TELEPHONE (OUTPATIENT)
Dept: PRIMARY CARE | Facility: CLINIC | Age: 45
End: 2025-05-08
Payer: COMMERCIAL

## 2025-05-08 DIAGNOSIS — M10.071 ACUTE IDIOPATHIC GOUT INVOLVING TOE OF RIGHT FOOT: ICD-10-CM

## 2025-05-08 RX ORDER — ALLOPURINOL 300 MG/1
300 TABLET ORAL DAILY
Qty: 90 TABLET | Refills: 3 | Status: SHIPPED | OUTPATIENT
Start: 2025-05-08 | End: 2026-05-08

## 2025-05-08 NOTE — TELEPHONE ENCOUNTER
Is requesting a refill on allopurinol be sent to [facility]. Please call pt when this is done.

## 2025-05-30 ENCOUNTER — OFFICE VISIT (OUTPATIENT)
Dept: URGENT CARE | Facility: CLINIC | Age: 45
End: 2025-05-30
Payer: COMMERCIAL

## 2025-05-30 VITALS
RESPIRATION RATE: 18 BRPM | HEART RATE: 79 BPM | OXYGEN SATURATION: 95 % | DIASTOLIC BLOOD PRESSURE: 105 MMHG | SYSTOLIC BLOOD PRESSURE: 167 MMHG | TEMPERATURE: 98.2 F

## 2025-05-30 DIAGNOSIS — H00.021 HORDEOLUM INTERNUM OF RIGHT UPPER EYELID: Primary | ICD-10-CM

## 2025-05-30 DIAGNOSIS — B35.1 ONYCHOMYCOSIS: ICD-10-CM

## 2025-05-30 PROCEDURE — 99213 OFFICE O/P EST LOW 20 MIN: CPT | Performed by: NURSE PRACTITIONER

## 2025-05-30 RX ORDER — CICLOPIROX 80 MG/ML
SOLUTION TOPICAL NIGHTLY
Qty: 6.6 ML | Refills: 0 | Status: SHIPPED | OUTPATIENT
Start: 2025-05-30

## 2025-05-30 RX ORDER — CEFADROXIL 500 MG/1
CAPSULE ORAL
Qty: 21 CAPSULE | Refills: 0 | Status: SHIPPED | OUTPATIENT
Start: 2025-05-30

## 2025-05-30 RX ORDER — ERYTHROMYCIN 5 MG/G
OINTMENT OPHTHALMIC
Qty: 3.5 G | Refills: 0 | Status: SHIPPED | OUTPATIENT
Start: 2025-05-30

## 2025-05-30 NOTE — PROGRESS NOTES
[facility] URGENT CARE  Trixie Hunter, APRN-CNP     Visit Note - 5/30/2025 4:05 PM   This note was generated with voice recognition software and may contain errors including spelling, grammar, syntax, and misrecognization of what was dictated.    Patient: Connor Thornton, MRN: [id number], 44 y.o., male   PCP: Dhiraj Scanlon MD  ------------------------------------  ALLERGIES: Allergies[1]     CURRENT MEDICATIONS:   Current Outpatient Medications   Medication Instructions    albuterol 90 mcg/actuation inhaler 2 puffs, inhalation, Every 6 hours PRN    allopurinol (ZYLOPRIM) 300 mg, oral, Daily    benzonatate (TESSALON) 100-200 mg, oral, Every 8 hours PRN, Do not crush or chew.    cefadroxil (Duricef) 500 mg capsule Take 2 cap(s) orally to start, then 1 capsule 2 times a day x 10 days    ciclopirox (Penlac) 8 % solution Topical, Nightly, Apply to adjacent skin and affected nails once daily (preferably at bedtime or 8 hours before washing). Remove with alcohol every 7 days.    erythromycin (Romycin) 5 mg/gram (0.5 %) ophthalmic ointment 1 application in affected eye 4 times a day x 7 days. Apply 1 cm strip of ointment into lower conjunctival sac.    esomeprazole (NEXIUM) 40 mg, Daily PRN    fluconazole (DIFLUCAN) 150 mg, oral, Every 72 hours    fluticasone (Flonase Allergy Relief) 50 mcg/actuation nasal spray Administer into affected nostril(s).    levocetirizine (Xyzal) 5 mg tablet Every evening    montelukast (SINGULAIR) 10 mg, oral, Nightly    predniSONE (Deltasone) 10 mg tablet Take 6 tabs PO daily x1 day, then take 5 tabs daily x1 day, then take 4 tabs daily x1 day, then take 3 tabs daily x1 day, then take 2 tabs daily x1 day, then take 1 tab daily x1 day. Take with a meal.    promethazine-DM (Phenergan-DM) 6.25-15 mg/5 mL syrup 5 mL, oral, Every 6 hours PRN, *caution - can cause drowsiness*    valsartan-hydrochlorothiazide (Diovan-HCT) 160-25 mg tablet 1 tablet, oral, Daily     valsartan-hydrochlorothiazide (Diovan-HCT) 160-25 mg tablet 1 tablet, oral, Daily     ------------------------------------  PAST MEDICAL HX:  Problem List[2]   SURGICAL HX:  Surgical History[3]   FAMILY HX:   No pertinent history.   SOCIAL HX:    reports that he has never smoked. He has never used smokeless tobacco.  ------------------------------------  CHIEF COMPLAINT:   Chief Complaint   Patient presents with    Eye Problem     Rt eye redness/drainage x 1 day       HISTORY OF PRESENT ILLNESS: The history was obtained from patientTod Ryan is a 44 y.o. male, who presents with a chief complaint of     REVIEW OF SYSTEMS:  10 systems reviewed negative with exception of history of present illness as listed above.    TODAY'S VITALS: BP (!) 167/105 (BP Location: Left arm, Patient Position: Sitting, BP Cuff Size: Large adult)   Pulse 79   Temp 36.8 °C (98.2 °F) (Temporal)   Resp 18   SpO2 95%   Recheck BP: 132/94    PHYSICAL EXAMINATION:      ------------------------------------  Medical Decision Making  LABORATORY or RADIOLOGICAL IMAGING ORDERS/RESULTS:   None    IMPRESSION/PLAN:  Course: Worsening; stable    1. Hordeolum internum of right upper eyelid (Primary)  2. Onychomycosis  - cefadroxil (Duricef) 500 mg capsule; Take 2 cap(s) orally to start, then 1 capsule 2 times a day x 10 days  Dispense: 21 capsule; Refill: 0  - erythromycin (Romycin) 5 mg/gram (0.5 %) ophthalmic ointment; 1 application in affected eye 4 times a day x 7 days. Apply 1 cm strip of ointment into lower conjunctival sac.  Dispense: 3.5 g; Refill: 0  - ciclopirox (Penlac) 8 % solution; Apply topically once daily at bedtime. Apply to adjacent skin and affected nails once daily (preferably at bedtime or 8 hours before washing). Remove with alcohol every 7 days.  Dispense: 6.6 mL; Refill: 0        GET Ken-CNP   Advanced Practice Provider  [facility] URGENT CARE       [1]   Allergies  Allergen Reactions    Bee Pollen Unknown     House Dust Unknown    Mold Unknown    Amoxicillin-Pot Clavulanate Rash, Unknown and Hives   [2]   Patient Active Problem List  Diagnosis    Abnormal chest xray    Asthma    Chronic maxillary sinusitis    Cough    Dyspnea on exertion    Fever    GERD (gastroesophageal reflux disease)    Gout    HTN (hypertension)    Obesity (BMI 30-39.9)    Skin infection    Seasonal allergies    Kommerell's diverticulum ([facility]-HCC)   [3]   Past Surgical History:  Procedure Laterality Date    OTHER SURGICAL HISTORY  11/03/2020    Knee surgery      and topical antibiotic (Cefadroxil and Erythromycin ointment) today. Can use tylenol/ibuprofen PRN for discomfort. Warm compresses and gentle lid massage may be helpful. Discussed expectations for resolution of stye, reviewed red flags to monitor for, counseled on risks and potential adverse reactions of treatments, and encouraged to f/u here or with PCP if sxs worsen or if not improving over the next 2-3 days despite antibiotic treatment. Will also start Ciclopirox for toenail fungus - discussed instructions and expectations for improvement. Nail does not appear to be infected at this point, but is slightly ingrown, so could benefit from anchor taping and other strategies (and/or podiatry) to help with management moving forward. Patient verbalized understanding and agrees with plan of care; questions were encouraged and answered.      GET Ken-CNP   Advanced Practice Provider  [facility] URGENT CARE         [1]   Allergies  Allergen Reactions    Bee Pollen Unknown    House Dust Unknown    Mold Unknown    Amoxicillin-Pot Clavulanate Rash, Unknown and Hives   [2]   Patient Active Problem List  Diagnosis    Abnormal chest xray    Asthma    Chronic maxillary sinusitis    Cough    Dyspnea on exertion    Fever    GERD (gastroesophageal reflux disease)    Gout    HTN (hypertension)    Obesity (BMI 30-39.9)    Skin infection    Seasonal allergies    Kommerell's diverticulum (HHS-HCC)   [3]   Past Surgical History:  Procedure Laterality Date    OTHER SURGICAL HISTORY  11/03/2020    Knee surgery

## 2025-08-11 ENCOUNTER — OFFICE VISIT (OUTPATIENT)
Dept: URGENT CARE | Facility: CLINIC | Age: 45
End: 2025-08-11
Payer: COMMERCIAL

## 2025-08-11 VITALS
BODY MASS INDEX: 41.31 KG/M2 | HEART RATE: 66 BPM | DIASTOLIC BLOOD PRESSURE: 93 MMHG | TEMPERATURE: 98.1 F | HEIGHT: 72 IN | OXYGEN SATURATION: 97 % | SYSTOLIC BLOOD PRESSURE: 137 MMHG | WEIGHT: 305 LBS

## 2025-08-11 DIAGNOSIS — L24.7 IRRITANT CONTACT DERMATITIS DUE TO PLANTS, EXCEPT FOOD: Primary | ICD-10-CM

## 2025-08-11 PROCEDURE — 99213 OFFICE O/P EST LOW 20 MIN: CPT | Mod: 25 | Performed by: NURSE PRACTITIONER

## 2025-08-11 PROCEDURE — 96372 THER/PROPH/DIAG INJ SC/IM: CPT | Performed by: NURSE PRACTITIONER

## 2025-08-11 PROCEDURE — 2500000004 HC RX 250 GENERAL PHARMACY W/ HCPCS (ALT 636 FOR OP/ED): Mod: JZ | Performed by: NURSE PRACTITIONER

## 2025-08-11 RX ORDER — PREDNISONE 10 MG/1
TABLET ORAL
Qty: 30 TABLET | Refills: 0 | Status: SHIPPED | OUTPATIENT
Start: 2025-08-11

## 2025-08-11 RX ORDER — TRIAMCINOLONE ACETONIDE 5 MG/G
CREAM TOPICAL 3 TIMES DAILY
Qty: 30 G | Refills: 0 | Status: SHIPPED | OUTPATIENT
Start: 2025-08-11

## 2025-08-11 RX ORDER — METHYLPREDNISOLONE SODIUM SUCCINATE 125 MG/2ML
125 INJECTION INTRAMUSCULAR; INTRAVENOUS ONCE
Status: COMPLETED | OUTPATIENT
Start: 2025-08-11 | End: 2025-08-11

## 2025-08-11 RX ADMIN — METHYLPREDNISOLONE SODIUM SUCCINATE 125 MG: 125 INJECTION, POWDER, FOR SOLUTION INTRAMUSCULAR; INTRAVENOUS at 10:22

## 2025-09-25 ENCOUNTER — APPOINTMENT (OUTPATIENT)
Dept: PRIMARY CARE | Facility: CLINIC | Age: 45
End: 2025-09-25
Payer: COMMERCIAL